# Patient Record
Sex: FEMALE | Race: WHITE | NOT HISPANIC OR LATINO | URBAN - METROPOLITAN AREA
[De-identification: names, ages, dates, MRNs, and addresses within clinical notes are randomized per-mention and may not be internally consistent; named-entity substitution may affect disease eponyms.]

---

## 2019-03-20 ENCOUNTER — INPATIENT (INPATIENT)
Facility: HOSPITAL | Age: 79
LOS: 0 days | Discharge: HOME | End: 2019-03-21
Attending: HOSPITALIST | Admitting: HOSPITALIST

## 2019-03-20 VITALS
TEMPERATURE: 98 F | RESPIRATION RATE: 18 BRPM | HEART RATE: 95 BPM | WEIGHT: 199.96 LBS | OXYGEN SATURATION: 95 % | DIASTOLIC BLOOD PRESSURE: 74 MMHG | SYSTOLIC BLOOD PRESSURE: 155 MMHG

## 2019-03-20 LAB
ALBUMIN SERPL ELPH-MCNC: 4.4 G/DL — SIGNIFICANT CHANGE UP (ref 3.5–5.2)
ALP SERPL-CCNC: 89 U/L — SIGNIFICANT CHANGE UP (ref 30–115)
ALT FLD-CCNC: 10 U/L — SIGNIFICANT CHANGE UP (ref 0–41)
ANION GAP SERPL CALC-SCNC: 10 MMOL/L — SIGNIFICANT CHANGE UP (ref 7–14)
APTT BLD: 30.2 SEC — SIGNIFICANT CHANGE UP (ref 27–39.2)
AST SERPL-CCNC: 12 U/L — SIGNIFICANT CHANGE UP (ref 0–41)
BASOPHILS # BLD AUTO: 0.06 K/UL — SIGNIFICANT CHANGE UP (ref 0–0.2)
BASOPHILS NFR BLD AUTO: 0.9 % — SIGNIFICANT CHANGE UP (ref 0–1)
BILIRUB SERPL-MCNC: 0.5 MG/DL — SIGNIFICANT CHANGE UP (ref 0.2–1.2)
BUN SERPL-MCNC: 7 MG/DL — LOW (ref 10–20)
CALCIUM SERPL-MCNC: 9.1 MG/DL — SIGNIFICANT CHANGE UP (ref 8.5–10.1)
CHLORIDE SERPL-SCNC: 105 MMOL/L — SIGNIFICANT CHANGE UP (ref 98–110)
CO2 SERPL-SCNC: 25 MMOL/L — SIGNIFICANT CHANGE UP (ref 17–32)
CREAT SERPL-MCNC: <0.5 MG/DL — LOW (ref 0.7–1.5)
EOSINOPHIL # BLD AUTO: 0.05 K/UL — SIGNIFICANT CHANGE UP (ref 0–0.7)
EOSINOPHIL NFR BLD AUTO: 0.8 % — SIGNIFICANT CHANGE UP (ref 0–8)
GLUCOSE SERPL-MCNC: 109 MG/DL — HIGH (ref 70–99)
HCT VFR BLD CALC: 47.4 % — HIGH (ref 37–47)
HGB BLD-MCNC: 15.7 G/DL — SIGNIFICANT CHANGE UP (ref 12–16)
IMM GRANULOCYTES NFR BLD AUTO: 0.5 % — HIGH (ref 0.1–0.3)
INR BLD: 0.98 RATIO — SIGNIFICANT CHANGE UP (ref 0.65–1.3)
LYMPHOCYTES # BLD AUTO: 0.95 K/UL — LOW (ref 1.2–3.4)
LYMPHOCYTES # BLD AUTO: 14.8 % — LOW (ref 20.5–51.1)
MCHC RBC-ENTMCNC: 28.2 PG — SIGNIFICANT CHANGE UP (ref 27–31)
MCHC RBC-ENTMCNC: 33.1 G/DL — SIGNIFICANT CHANGE UP (ref 32–37)
MCV RBC AUTO: 85.1 FL — SIGNIFICANT CHANGE UP (ref 81–99)
MONOCYTES # BLD AUTO: 0.27 K/UL — SIGNIFICANT CHANGE UP (ref 0.1–0.6)
MONOCYTES NFR BLD AUTO: 4.2 % — SIGNIFICANT CHANGE UP (ref 1.7–9.3)
NEUTROPHILS # BLD AUTO: 5.08 K/UL — SIGNIFICANT CHANGE UP (ref 1.4–6.5)
NEUTROPHILS NFR BLD AUTO: 78.8 % — HIGH (ref 42.2–75.2)
NRBC # BLD: 0 /100 WBCS — SIGNIFICANT CHANGE UP (ref 0–0)
PLATELET # BLD AUTO: 275 K/UL — SIGNIFICANT CHANGE UP (ref 130–400)
POTASSIUM SERPL-MCNC: 4.1 MMOL/L — SIGNIFICANT CHANGE UP (ref 3.5–5)
POTASSIUM SERPL-SCNC: 4.1 MMOL/L — SIGNIFICANT CHANGE UP (ref 3.5–5)
PROT SERPL-MCNC: 7.4 G/DL — SIGNIFICANT CHANGE UP (ref 6–8)
PROTHROM AB SERPL-ACNC: 11.3 SEC — SIGNIFICANT CHANGE UP (ref 9.95–12.87)
RBC # BLD: 5.57 M/UL — HIGH (ref 4.2–5.4)
RBC # FLD: 13.2 % — SIGNIFICANT CHANGE UP (ref 11.5–14.5)
SODIUM SERPL-SCNC: 140 MMOL/L — SIGNIFICANT CHANGE UP (ref 135–146)
TROPONIN T SERPL-MCNC: <0.01 NG/ML — SIGNIFICANT CHANGE UP
WBC # BLD: 6.44 K/UL — SIGNIFICANT CHANGE UP (ref 4.8–10.8)
WBC # FLD AUTO: 6.44 K/UL — SIGNIFICANT CHANGE UP (ref 4.8–10.8)

## 2019-03-20 RX ORDER — MECLIZINE HCL 12.5 MG
25 TABLET ORAL THREE TIMES A DAY
Qty: 0 | Refills: 0 | Status: DISCONTINUED | OUTPATIENT
Start: 2019-03-20 | End: 2019-03-21

## 2019-03-20 RX ORDER — ALPRAZOLAM 0.25 MG
0.25 TABLET ORAL ONCE
Qty: 0 | Refills: 0 | Status: DISCONTINUED | OUTPATIENT
Start: 2019-03-20 | End: 2019-03-20

## 2019-03-20 RX ORDER — ALPRAZOLAM 0.25 MG
0 TABLET ORAL
Qty: 0 | Refills: 0 | COMMUNITY

## 2019-03-20 RX ORDER — CHLORHEXIDINE GLUCONATE 213 G/1000ML
1 SOLUTION TOPICAL
Qty: 0 | Refills: 0 | Status: DISCONTINUED | OUTPATIENT
Start: 2019-03-20 | End: 2019-03-21

## 2019-03-20 RX ORDER — ALPRAZOLAM 0.25 MG
0.25 TABLET ORAL EVERY 8 HOURS
Qty: 0 | Refills: 0 | Status: DISCONTINUED | OUTPATIENT
Start: 2019-03-20 | End: 2019-03-21

## 2019-03-20 RX ADMIN — Medication 0.25 MILLIGRAM(S): at 15:49

## 2019-03-20 NOTE — ED PROVIDER NOTE - OBJECTIVE STATEMENT
this is 78 yo female presents to ed for evaluation of dizziness  going on for about 3 weeks. patient states she is dizzy in am when she is first getting up. patients that for past couple of days zulma today . patient is denying any other symptoms

## 2019-03-20 NOTE — ED PROVIDER NOTE - ATTENDING CONTRIBUTION TO CARE
79F presenting with 3 weeks of dizziness worse when she wakes up. Has trouble walking. Endorses intermittent vertigo and other occurrences. Worse today. 79F presenting with 3 weeks of dizziness worse when she wakes up. Has trouble walking. Endorses intermittent vertigo sensation. Worse today.

## 2019-03-20 NOTE — ED ADULT NURSE NOTE - CHPI ED NUR SYMPTOMS NEG
no weakness/no confusion/no loss of consciousness/no vomiting/no fever/no nausea/no numbness/no blurred vision/no change in level of consciousness

## 2019-03-20 NOTE — ED ADULT NURSE NOTE - NSIMPLEMENTINTERV_GEN_ALL_ED
Implemented All Universal Safety Interventions:  [place] to call system. Call bell, personal items and telephone within reach. Instruct patient to call for assistance. Room bathroom lighting operational. Non-slip footwear when patient is off stretcher. Physically safe environment: no spills, clutter or unnecessary equipment. Stretcher in lowest position, wheels locked, appropriate side rails in place.

## 2019-03-20 NOTE — H&P ADULT - NSHPPHYSICALEXAM_GEN_ALL_CORE
Vital Signs Last 24 Hrs  T(C): 36 (20 Mar 2019 15:44), Max: 36.5 (20 Mar 2019 12:54)  T(F): 96.8 (20 Mar 2019 15:44), Max: 97.7 (20 Mar 2019 12:54)  HR: 77 (20 Mar 2019 15:44) (77 - 95)  BP: 136/85 (20 Mar 2019 15:44) (136/85 - 155/74)  BP(mean): --  RR: 18 (20 Mar 2019 15:44) (18 - 18)  SpO2: 96% (20 Mar 2019 15:44) (95% - 96%)    PHYSICAL EXAM:  GENERAL: NAD, well-groomed, well-developed  HEAD:  Atraumatic, Normocephalic  EYES: EOMI, PERRLA, conjunctiva and sclera clear  NERVOUS SYSTEM:  Alert & Oriented X 4, Good concentration; Motor Strength 5/5 B/L upper and lower extremities; DTRs 2+ intact and symmetric  CHEST/LUNG: Clear to percussion bilaterally; No rales, rhonchi, wheezing, or rubs  HEART: Regular rate and rhythm; No murmurs, rubs, or gallops  ABDOMEN: Soft, Nontender, Nondistended; Bowel sounds present  EXTREMITIES:  2+ Peripheral Pulses, No clubbing, cyanosis, or edema  SKIN: No rashes or lesions

## 2019-03-20 NOTE — ED PROVIDER NOTE - PHYSICAL EXAMINATION
--EXAM--  VITAL SIGNS: I have reviewed vs documented at present.  CONSTITUTIONAL: Well-developed; well-nourished; in no acute distress.   SKIN: Warm and dry, no acute rash.   HEAD: Normocephalic; atraumatic.  EYES: PERRL, EOM intact; conjunctiva and sclera clear. No nystagmus.  ENT: No nasal discharge; airway clear.  NECK: Supple; non tender.  CARD: S1, S2, Regular rate and rhythm.   RESP: No wheezes, rales or rhonchi.  ABD: Normal bowel sounds; soft; non-distended; non-tender.  EXT: Normal ROM.   NEURO: Alert, oriented, grossly unremarkable. Strength 5/5 in all extremities. Sensation intact throughout.  walked patient in ed and she is off balanced  PSYCH: Cooperative, appropriate.

## 2019-03-20 NOTE — H&P ADULT - ASSESSMENT
78 yo female presents to ed for evaluation of dizziness going on for about 3 weeks. Patient states she is dizzy in the morning upon waking up but for the past two days the dizziness has been present all day.  Denies chest pain, shortness of breath, headache, weakness or vision problems.      #Vertigo  - trial of meclizine  - neuro consult   - pt/rehab    #anxiety/depression  -continue xanax prn      # Progress Note Handoff  PENDING as follows  consults: neuro  Test:   Other:  Family discussion:  Disposition: anticipate d/c in am if cleared by neuro   Home __x__ or SNF___ Other _____ Unknown at this time ____

## 2019-03-20 NOTE — ED PROVIDER NOTE - NS ED ROS FT
Review of Systems:  	•	CONSTITUTIONAL - no fever, no diaphoresis, no chills  	•	SKIN - no rash  	•	HEMATOLOGIC - no bleeding, no bruising  	•	EYES - no eye pain, no blurry vision  	•	ENT - no change in hearing, no sore throat, no ear pain or tinnitus  	•	RESPIRATORY - no shortness of breath, no cough  	•	CARDIAC - no chest pain, no palpitations  	•	GI - no abd pain, no nausea, no vomiting, no diarrhea, no constipation  	•	GENITO-URINARY - no discharge, no dysuria; no hematuria, no increased urinary frequency  	•	MUSCULOSKELETAL - no joint paint, no swelling, no redness  	•	NEUROLOGIC - dizziness   no weakness, no headache, no paresthesias, no LOC  	•	PSYCH - no anxiety, non suicidal, non homicidal, no hallucination, no depression

## 2019-03-20 NOTE — ED PROVIDER NOTE - CLINICAL SUMMARY MEDICAL DECISION MAKING FREE TEXT BOX
Pt with persistent vertigo despite treatment. admitted to medicine for ambulation dysfunction and treatment of dizziness.

## 2019-03-20 NOTE — H&P ADULT - HISTORY OF PRESENT ILLNESS
80 yo female presents to ed for evaluation of dizziness going on for about 3 weeks. Patient states she is dizzy in the morning upon waking up but for the past two days the dizziness has been present all day.  Denies chest pain, shortness of breath, headache, weakness or vision problems.

## 2019-03-20 NOTE — H&P ADULT - NSHPLABSRESULTS_GEN_ALL_CORE
15.7   6.44  )-----------( 275      ( 20 Mar 2019 13:35 )             47.4       03-20    140  |  105  |  7<L>  ----------------------------<  109<H>  4.1   |  25  |  <0.5<L>    Ca    9.1      20 Mar 2019 13:35    TPro  7.4  /  Alb  4.4  /  TBili  0.5  /  DBili  x   /  AST  12  /  ALT  10  /  AlkPhos  89  03-20      < from: CT Head No Cont (03.20.19 @ 14:04) >    IMPRESSION:     1.  Periventricular and subcortical white matter chronic small vessel   ischemic changes.    2.  No acute mass effect, midline shift or hemorrhage.      3.  If the patient continues to be symptomatic follow-up MRI of the brain   may be helpful for further evaluation.    < end of copied text >

## 2019-03-21 VITALS
RESPIRATION RATE: 16 BRPM | HEART RATE: 85 BPM | DIASTOLIC BLOOD PRESSURE: 61 MMHG | SYSTOLIC BLOOD PRESSURE: 111 MMHG | TEMPERATURE: 98 F

## 2019-03-21 RX ORDER — MECLIZINE HCL 12.5 MG
1 TABLET ORAL
Qty: 90 | Refills: 0 | OUTPATIENT
Start: 2019-03-21 | End: 2019-04-19

## 2019-03-21 RX ADMIN — Medication 0.25 MILLIGRAM(S): at 09:47

## 2019-03-21 RX ADMIN — CHLORHEXIDINE GLUCONATE 1 APPLICATION(S): 213 SOLUTION TOPICAL at 09:11

## 2019-03-21 RX ADMIN — Medication 0.25 MILLIGRAM(S): at 01:29

## 2019-03-21 RX ADMIN — Medication 25 MILLIGRAM(S): at 06:21

## 2019-03-21 NOTE — CHART NOTE - NSCHARTNOTEFT_GEN_A_CORE
Pt seen and examined at bedside. Dizziness resolved. Discussed vestibular rehab and use of meclizine with pt. Agreeable for discharge    PHYSICAL EXAM:  GENERAL: NAD, speaks in full sentences, no signs of respiratory distress  HEAD:  Atraumatic, Normocephalic  EYES:  Anicteric  NECK: Supple, No JVD  CHEST/LUNG: Clear to auscultation bilaterally; No wheeze; No crackles; No accessory muscles used  HEART: Regular rate and rhythm; No murmurs;   ABDOMEN: Soft, Nontender, Nondistended; Bowel sounds present; No guarding  EXTREMITIES:  2+ Peripheral Pulses, No cyanosis or edema  PSYCH: AAOx3  NEUROLOGY: non-focal  SKIN: No rashes or lesions

## 2019-03-21 NOTE — DISCHARGE NOTE PROVIDER - HOSPITAL COURSE
80 yo F presents for evaluation of several weeks of dizziness. CT head was negative. Likely BPPV. Improved after meclizine. Seen by neurology, medically stable for d/c with vestibular rehab.

## 2019-03-21 NOTE — DISCHARGE NOTE PROVIDER - NSDCCPCAREPLAN_GEN_ALL_CORE_FT
PRINCIPAL DISCHARGE DIAGNOSIS  Diagnosis: Dizziness  Assessment and Plan of Treatment: due to veritgo.

## 2019-03-21 NOTE — CONSULT NOTE ADULT - SUBJECTIVE AND OBJECTIVE BOX
Neurology Consultation note    Name  SANDHYA CEE    HPI:  80 yo female presents to ed for evaluation of dizziness going on for about 3 weeks. Patient states she is dizzy in the morning upon waking up but for the past two days the dizziness has been present all day.  Denies chest pain, shortness of breath, headache, weakness or vision problems. (20 Mar 2019 16:20)    NEURO:  PATIENT IS A 78 YO FEMALE WITH ACUTE ON CHRONIC VERTIGO AND IMBALANCE.  THIS HAS BEEN HAPPENING FOR MONTHS BUT WAS WORSE BEFORE ADM  NOW SHE IS ASYMPTOMATIC  PATIENT TAKES XANAX FOR YRS BUT DIDNT CHANGE OR INCREASE DOSAGE   NO FALLS  NO HEARING LOSS , TINNITUS OR ANTECEDENT ILLNESS    Vital Signs Last 24 Hrs  T(C): 36.7 (21 Mar 2019 05:45), Max: 36.7 (21 Mar 2019 05:45)  T(F): 98 (21 Mar 2019 05:45), Max: 98 (21 Mar 2019 05:45)  HR: 85 (21 Mar 2019 05:45) (77 - 95)  BP: 111/61 (21 Mar 2019 05:45) (111/61 - 164/78)  BP(mean): --  RR: 16 (21 Mar 2019 05:45) (16 - 18)  SpO2: 96% (20 Mar 2019 15:44) (95% - 96%)    Neurological Exam:   Mental status: Awake, alert and oriented x3.  Recent and remote memory intact.  Naming, repetition and comprehension intact.  Attention/concentration intact.  No dysarthria, no aphasia.  Fund of knowledge appropriate.    Cranial nerves: Pupils equally round and reactive to light, visual fields full, no nystagmus, extraocular muscles intact, V1 through V3 intact bilaterally and symmetric, face symmetric, hearing intact to finger rub, palate elevation symmetric, tongue was midline.  Motor:  MRC grading 5/5 b/l UE/LE.   strength 5/5.  Normal tone and bulk.  No abnormal movements.    Sensation: Intact to light touch, proprioception, and pinprick.   Coordination: No dysmetria on finger-to-nose and heel-to-shin.  No dysdiadokinesia.  Reflexes: 2+ in bilateral UE/LE, downgoing toes bilaterally. (-) Mathews.  Gait: CAUTIOUS BUT NOT ATAXIC    Medications  ALPRAZolam 0.25 milliGRAM(s) Oral every 8 hours PRN  chlorhexidine 2% Cloths 1 Application(s) Topical <User Schedule>  chlorhexidine 4% Liquid 1 Application(s) Topical <User Schedule>  meclizine 25 milliGRAM(s) Oral three times a day PRN      Lab  03-20    140  |  105  |  7<L>  ----------------------------<  109<H>  4.1   |  25  |  <0.5<L>    Ca    9.1      20 Mar 2019 13:35    TPro  7.4  /  Alb  4.4  /  TBili  0.5  /  DBili  x   /  AST  12  /  ALT  10  /  AlkPhos  89  03-20                          15.7   6.44  )-----------( 275      ( 20 Mar 2019 13:35 )             47.4     LIVER FUNCTIONS - ( 20 Mar 2019 13:35 )  Alb: 4.4 g/dL / Pro: 7.4 g/dL / ALK PHOS: 89 U/L / ALT: 10 U/L / AST: 12 U/L / GGT: x                   Radiology  CT Head No Cont:   EXAM:  CT BRAIN            PROCEDURE DATE:  03/20/2019            INTERPRETATION:  Clinical History / Reason for exam: Dizziness. Shortness   of breath.    Technique: Multiple contiguous axial CT images of the head were obtained   from the base of the skull to the vertex without administration of   intravenous contrast.    Comparison: None available at this time.       Findings: The ventricles, basal cisterns and sulcal pattern are slightly   prominent consistent with parenchymal volume loss. There are scattered   punctate foci of hypodensities in the periventricular and subcortical   white matter which are nonspecific and without mass effect most likely   consistent with chronic small vessel ischemic changes in a patient of   this stated age. There is no acute mass effect, midline shift or   hemorrhage. No extra-axial fluid collections are identified.    The bones of the calvarium are intact. The paranasal sinuses, bilateral   mastoid complexes and globes and orbits are grossly unremarkable. The   lenses are thin and may have been replaced.    Vascular calcifications are noted in the bilateral carotid arteries   consistent with atherosclerotic changes.    Beam hardening artifact is noted overlying the brain stem and posterior   fossa which is inherent to CT in this location.      IMPRESSION:     1.  Periventricular and subcortical white matter chronic small vessel   ischemic changes.    2.  No acute mass effect, midline shift or hemorrhage.      3.  If the patient continues to be symptomatic follow-up MRI of the brain   may be helpful for further evaluation.              Assessment: 78 YO FEMALE WITH ACUTE ON CHRONIC VERTIGO. VERTIGO OF PERIPHERAL ETIOLOGY, LIKELY BPPV  EXAM NON FOCAL  PATIENT IS ASYMPTOMATIC THIS AM. CTH NO ACUTE CHANGE  GAIT IS CAUTIOUS      Plan:  OUTPT VESTIBULAR THERAPY  MECLIZINE PRN  NO FURTHER INPT W/U FROM NEURO STANDPOINT  PLEASE CALL WITH ANY QUESTIONS

## 2019-03-21 NOTE — DISCHARGE NOTE NURSING/CASE MANAGEMENT/SOCIAL WORK - NSDCDPATPORTLINK_GEN_ALL_CORE
You can access the [facility] Patient Portal, offered by [facility], by registering with the following website: [email or web address]

## 2019-03-21 NOTE — PHYSICAL THERAPY INITIAL EVALUATION ADULT - GENERAL OBSERVATIONS, REHAB EVAL
09:25-09:48 Chart reviewed. Pt encountered sitting at edge of bed,  may be seen by Physical Therapist as confirmed with Nurse. Patient denied unusual pain at rest but "has knee pain form arthritis" when she walks

## 2019-03-25 DIAGNOSIS — F41.9 ANXIETY DISORDER, UNSPECIFIED: ICD-10-CM

## 2019-03-25 DIAGNOSIS — F32.9 MAJOR DEPRESSIVE DISORDER, SINGLE EPISODE, UNSPECIFIED: ICD-10-CM

## 2019-03-25 DIAGNOSIS — Z28.21 IMMUNIZATION NOT CARRIED OUT BECAUSE OF PATIENT REFUSAL: ICD-10-CM

## 2019-03-25 DIAGNOSIS — R42 DIZZINESS AND GIDDINESS: ICD-10-CM

## 2019-03-25 DIAGNOSIS — H81.10 BENIGN PAROXYSMAL VERTIGO, UNSPECIFIED EAR: ICD-10-CM

## 2022-07-12 NOTE — ED PROVIDER NOTE - EKG #1 DATE/TIME
Patient is calling regarding cancelling an appointment      Date/Time: 07/13/2022 @ 10:00 AM    Patient was rescheduled: YES [] NO [x]    Patient requesting call back to reschedule: YES [] NO [x]
20-Mar-2019 16:24

## 2023-07-06 NOTE — PHYSICAL THERAPY INITIAL EVALUATION ADULT - GAIT DEVIATIONS NOTED, PT EVAL
decreased step length/decreased weight-shifting ability/wide RAUL, dec heel strike/pushoff/decreased beth No

## 2024-02-05 ENCOUNTER — EMERGENCY (EMERGENCY)
Facility: HOSPITAL | Age: 84
LOS: 0 days | Discharge: HOME | End: 2024-02-05
Attending: STUDENT IN AN ORGANIZED HEALTH CARE EDUCATION/TRAINING PROGRAM
Payer: MEDICARE

## 2024-02-05 VITALS
HEART RATE: 120 BPM | OXYGEN SATURATION: 94 % | SYSTOLIC BLOOD PRESSURE: 135 MMHG | WEIGHT: 199.96 LBS | DIASTOLIC BLOOD PRESSURE: 72 MMHG | RESPIRATION RATE: 18 BRPM | HEIGHT: 60 IN | TEMPERATURE: 97 F

## 2024-02-05 VITALS
DIASTOLIC BLOOD PRESSURE: 71 MMHG | RESPIRATION RATE: 18 BRPM | SYSTOLIC BLOOD PRESSURE: 129 MMHG | HEART RATE: 93 BPM | OXYGEN SATURATION: 95 %

## 2024-02-05 DIAGNOSIS — R10.13 EPIGASTRIC PAIN: ICD-10-CM

## 2024-02-05 DIAGNOSIS — R00.0 TACHYCARDIA, UNSPECIFIED: ICD-10-CM

## 2024-02-05 DIAGNOSIS — F32.A DEPRESSION, UNSPECIFIED: ICD-10-CM

## 2024-02-05 DIAGNOSIS — F41.9 ANXIETY DISORDER, UNSPECIFIED: ICD-10-CM

## 2024-02-05 DIAGNOSIS — R07.89 OTHER CHEST PAIN: ICD-10-CM

## 2024-02-05 PROBLEM — F32.9 MAJOR DEPRESSIVE DISORDER, SINGLE EPISODE, UNSPECIFIED: Chronic | Status: ACTIVE | Noted: 2019-03-20

## 2024-02-05 LAB
ALBUMIN SERPL ELPH-MCNC: 4.4 G/DL — SIGNIFICANT CHANGE UP (ref 3.5–5.2)
ALP SERPL-CCNC: 98 U/L — SIGNIFICANT CHANGE UP (ref 30–115)
ALT FLD-CCNC: 10 U/L — SIGNIFICANT CHANGE UP (ref 0–41)
ANION GAP SERPL CALC-SCNC: 13 MMOL/L — SIGNIFICANT CHANGE UP (ref 7–14)
AST SERPL-CCNC: 9 U/L — SIGNIFICANT CHANGE UP (ref 0–41)
BASOPHILS # BLD AUTO: 0.06 K/UL — SIGNIFICANT CHANGE UP (ref 0–0.2)
BASOPHILS NFR BLD AUTO: 0.4 % — SIGNIFICANT CHANGE UP (ref 0–1)
BILIRUB SERPL-MCNC: 0.6 MG/DL — SIGNIFICANT CHANGE UP (ref 0.2–1.2)
BUN SERPL-MCNC: 9 MG/DL — LOW (ref 10–20)
CALCIUM SERPL-MCNC: 10.3 MG/DL — SIGNIFICANT CHANGE UP (ref 8.4–10.5)
CHLORIDE SERPL-SCNC: 97 MMOL/L — LOW (ref 98–110)
CO2 SERPL-SCNC: 23 MMOL/L — SIGNIFICANT CHANGE UP (ref 17–32)
CREAT SERPL-MCNC: 0.5 MG/DL — LOW (ref 0.7–1.5)
EGFR: 94 ML/MIN/1.73M2 — SIGNIFICANT CHANGE UP
EOSINOPHIL # BLD AUTO: 0.06 K/UL — SIGNIFICANT CHANGE UP (ref 0–0.7)
EOSINOPHIL NFR BLD AUTO: 0.4 % — SIGNIFICANT CHANGE UP (ref 0–8)
GLUCOSE SERPL-MCNC: 136 MG/DL — HIGH (ref 70–99)
HCT VFR BLD CALC: 46.8 % — SIGNIFICANT CHANGE UP (ref 37–47)
HGB BLD-MCNC: 16.1 G/DL — HIGH (ref 12–16)
IMM GRANULOCYTES NFR BLD AUTO: 0.4 % — HIGH (ref 0.1–0.3)
LIDOCAIN IGE QN: 13 U/L — SIGNIFICANT CHANGE UP (ref 7–60)
LYMPHOCYTES # BLD AUTO: 1.05 K/UL — LOW (ref 1.2–3.4)
LYMPHOCYTES # BLD AUTO: 7.5 % — LOW (ref 20.5–51.1)
MCHC RBC-ENTMCNC: 29.6 PG — SIGNIFICANT CHANGE UP (ref 27–31)
MCHC RBC-ENTMCNC: 34.4 G/DL — SIGNIFICANT CHANGE UP (ref 32–37)
MCV RBC AUTO: 86 FL — SIGNIFICANT CHANGE UP (ref 81–99)
MONOCYTES # BLD AUTO: 0.59 K/UL — SIGNIFICANT CHANGE UP (ref 0.1–0.6)
MONOCYTES NFR BLD AUTO: 4.2 % — SIGNIFICANT CHANGE UP (ref 1.7–9.3)
NEUTROPHILS # BLD AUTO: 12.16 K/UL — HIGH (ref 1.4–6.5)
NEUTROPHILS NFR BLD AUTO: 87.1 % — HIGH (ref 42.2–75.2)
NRBC # BLD: 0 /100 WBCS — SIGNIFICANT CHANGE UP (ref 0–0)
PLATELET # BLD AUTO: 297 K/UL — SIGNIFICANT CHANGE UP (ref 130–400)
PMV BLD: 9.2 FL — SIGNIFICANT CHANGE UP (ref 7.4–10.4)
POTASSIUM SERPL-MCNC: 3.7 MMOL/L — SIGNIFICANT CHANGE UP (ref 3.5–5)
POTASSIUM SERPL-SCNC: 3.7 MMOL/L — SIGNIFICANT CHANGE UP (ref 3.5–5)
PROT SERPL-MCNC: 7.5 G/DL — SIGNIFICANT CHANGE UP (ref 6–8)
RBC # BLD: 5.44 M/UL — HIGH (ref 4.2–5.4)
RBC # FLD: 12.9 % — SIGNIFICANT CHANGE UP (ref 11.5–14.5)
SODIUM SERPL-SCNC: 133 MMOL/L — LOW (ref 135–146)
TROPONIN T, HIGH SENSITIVITY RESULT: 8 NG/L — SIGNIFICANT CHANGE UP (ref 6–13)
TROPONIN T, HIGH SENSITIVITY RESULT: 9 NG/L — SIGNIFICANT CHANGE UP (ref 6–13)
WBC # BLD: 13.98 K/UL — HIGH (ref 4.8–10.8)
WBC # FLD AUTO: 13.98 K/UL — HIGH (ref 4.8–10.8)

## 2024-02-05 PROCEDURE — 84484 ASSAY OF TROPONIN QUANT: CPT

## 2024-02-05 PROCEDURE — 99285 EMERGENCY DEPT VISIT HI MDM: CPT | Mod: 25

## 2024-02-05 PROCEDURE — 96374 THER/PROPH/DIAG INJ IV PUSH: CPT

## 2024-02-05 PROCEDURE — 96375 TX/PRO/DX INJ NEW DRUG ADDON: CPT

## 2024-02-05 PROCEDURE — 36415 COLL VENOUS BLD VENIPUNCTURE: CPT

## 2024-02-05 PROCEDURE — 71045 X-RAY EXAM CHEST 1 VIEW: CPT | Mod: 26

## 2024-02-05 PROCEDURE — 99285 EMERGENCY DEPT VISIT HI MDM: CPT

## 2024-02-05 PROCEDURE — 85025 COMPLETE CBC W/AUTO DIFF WBC: CPT

## 2024-02-05 PROCEDURE — 83690 ASSAY OF LIPASE: CPT

## 2024-02-05 PROCEDURE — 93005 ELECTROCARDIOGRAM TRACING: CPT

## 2024-02-05 PROCEDURE — 71045 X-RAY EXAM CHEST 1 VIEW: CPT

## 2024-02-05 PROCEDURE — 93010 ELECTROCARDIOGRAM REPORT: CPT

## 2024-02-05 PROCEDURE — 80053 COMPREHEN METABOLIC PANEL: CPT

## 2024-02-05 RX ORDER — FAMOTIDINE 10 MG/ML
20 INJECTION INTRAVENOUS ONCE
Refills: 0 | Status: COMPLETED | OUTPATIENT
Start: 2024-02-05 | End: 2024-02-05

## 2024-02-05 RX ORDER — ONDANSETRON 8 MG/1
4 TABLET, FILM COATED ORAL ONCE
Refills: 0 | Status: COMPLETED | OUTPATIENT
Start: 2024-02-05 | End: 2024-02-05

## 2024-02-05 RX ORDER — SODIUM CHLORIDE 9 MG/ML
1000 INJECTION, SOLUTION INTRAVENOUS ONCE
Refills: 0 | Status: COMPLETED | OUTPATIENT
Start: 2024-02-05 | End: 2024-02-05

## 2024-02-05 RX ADMIN — FAMOTIDINE 20 MILLIGRAM(S): 10 INJECTION INTRAVENOUS at 08:38

## 2024-02-05 RX ADMIN — ONDANSETRON 4 MILLIGRAM(S): 8 TABLET, FILM COATED ORAL at 08:38

## 2024-02-05 RX ADMIN — SODIUM CHLORIDE 1000 MILLILITER(S): 9 INJECTION, SOLUTION INTRAVENOUS at 08:38

## 2024-02-05 NOTE — ED PROVIDER NOTE - CLINICAL SUMMARY MEDICAL DECISION MAKING FREE TEXT BOX
83-year-old female, past medical history of anxiety and depression, cataract surgery, presenting with epigastric pain this morning that feels like her typical "agita," no alleviating factors, worse after eating this morning, no associated symptoms.  She states that she feels better now.  Denies fevers, chills, chest pain, shortness of breath, nausea, vomiting, abdominal pain.  Elderly female in no acute distress.  Vitals reviewed.  Physical exam benign.  Right pupil 3 mm left pupil 2 mm both reactive to light.  Abdomen soft nondistended nontender.  Labs and EKG ordered and reviewed.  Imaging ordered and reviewed.  Medications given and effects reassessed.  Discussed all results with patient and son at bedside. Never had cardiac work up. 83-year-old female, past medical history of anxiety and depression, cataract surgery, presenting with epigastric pain this morning that feels like her typical "agita," no alleviating factors, worse after eating this morning, no associated symptoms.  She states that she feels better now.  Denies fevers, chills, chest pain, shortness of breath, nausea, vomiting, abdominal pain.  Elderly female in no acute distress.  Vitals reviewed.  Physical exam benign.  Right pupil 3 mm left pupil 2 mm both reactive to light.  Abdomen soft nondistended nontender.  Labs and EKG ordered and reviewed.  Imaging ordered and reviewed.  Medications given and effects reassessed with improvement.  Discussed all results with patient and son at bedside. Never had cardiac work up. Trop 8 & 9. Given return precautions and follow up outpatient. Patient and son comfortable with plan. Escalation to admission considered. Patient feels improved and prefers outpatient follow up.

## 2024-02-05 NOTE — ED PROVIDER NOTE - OBJECTIVE STATEMENT
83-year-old female with a history of anxiety and depression presents with epigastric pain this morning that was slightly worse than her typical "I did."  Denies shortness of breath difficulty breathing nausea vomiting.  Symptoms worse after she ate breakfast this morning but improved in the ER.  Denies shortness of breath chest pain difficulty breathing nausea vomiting.  Accompanied by .  Has not followed up with cardiologist

## 2024-02-05 NOTE — ED PROVIDER NOTE - PATIENT PORTAL LINK FT
You can access the FollowMyHealth Patient Portal offered by [facility] by registering at the following website: [email or web address]. By joining [facility]’s FollowMyHealth portal, you will also be able to view your health information using other applications (apps) compatible with our system.

## 2024-02-05 NOTE — ED PROVIDER NOTE - NSFOLLOWUPCLINICS_GEN_ALL_ED_FT
Cardiology at [facility] ([facility])  Cardiology  [address], [address]  [place] [address]  Phone: [phone]  Fax:   Follow Up Time: 1-3 Days    [facility] Cardiology at [place]  Cardiology  [address], [address]  [place] [address]  Phone: [phone]  Fax: [phone]  Follow Up Time: 1-3 Days

## 2024-02-05 NOTE — ED PROVIDER NOTE - NSFOLLOWUPINSTRUCTIONS_ED_ALL_ED_FT
Our Emergency Department Referral Coordinators will be reaching out to you in the next 24-48 hours from 9:00am to 5:00pm with a follow up appointment. Please expect a phone call from the hospital in that time frame. If you do not receive a call or if you have any questions or concerns, you can reach them at   [phone]    ~~~  Nonspecific Chest Pain  Chest pain can be caused by many different conditions. Some causes of chest pain can be life-threatening. These will require treatment right away. Serious causes of chest pain include:  Heart attack.A tear in the body's main blood vessel.Redness and swelling (inflammation) around your heart.Blood clot in your lungs.Other causes of chest pain may not be so serious. These include:  Heartburn.Anxiety or stress.Damage to bones or muscles in your chest.Lung infections.Chest pain can feel like:  Pain or discomfort in your chest.Crushing, pressure, aching, or squeezing pain. Burning or tingling. Dull or sharp pain that is worse when you move, cough, or take a deep breath. Pain or discomfort that is also felt in your back, neck, jaw, shoulder, or arm, or pain that spreads to any of these areas.It is hard to know whether your pain is caused by something that is serious or something that is not so serious. So it is important to see your doctor right away if you have chest pain.  Follow these instructions at home:  Medicines     Take over-the-counter and prescription medicines only as told by your doctor.If you were prescribed an antibiotic medicine, take it as told by your doctor. Do not stop taking the antibiotic even if you start to feel better.Lifestyle        Rest as told by your doctor.Do not use any products that contain nicotine or tobacco, such as cigarettes, e-cigarettes, and chewing tobacco. If you need help quitting, ask your doctor.Do not drink alcohol.Make lifestyle changes as told by your doctor. These may include:  Getting regular exercise. Ask your doctor what activities are safe for you.Eating a heart-healthy diet. A diet and nutrition specialist (dietitian) can help you to learn healthy eating options.Staying at a healthy weight.Treating diabetes or high blood pressure, if needed.Lowering your stress. Activities such as yoga and relaxation techniques can help.General instructions     Pay attention to any changes in your symptoms. Tell your doctor about them or any new symptoms.Avoid any activities that cause chest pain.Keep all follow-up visits as told by your doctor. This is important. You may need more testing if your chest pain does not go away.Contact a doctor if:  Your chest pain does not go away.You feel depressed.You have a fever.Get help right away if:  Your chest pain is worse.You have a cough that gets worse, or you cough up blood.You have very bad (severe) pain in your belly (abdomen).You pass out (faint).You have either of these for no clear reason:  Sudden chest discomfort.Sudden discomfort in your arms, back, neck, or jaw.You have shortness of breath at any time.You suddenly start to sweat, or your skin gets clammy.You feel sick to your stomach (nauseous).You throw up (vomit).You suddenly feel lightheaded or dizzy.You feel very weak or tired.Your heart starts to beat fast, or it feels like it is skipping beats.These symptoms may be an emergency. Do not wait to see if the symptoms will go away. Get medical help right away. Call your local emergency services (911 in the [place] Do not drive yourself to the hospital.   Summary  Chest pain can be caused by many different conditions. The cause may be serious and need treatment right away. If you have chest pain, see your doctor right away.Follow your doctor's instructions for taking medicines and making lifestyle changes. Keep all follow-up visits as told by your doctor. This includes visits for any further testing if your chest pain does not go away.Be sure to know the signs that show that your condition has become worse. Get help right away if you have these symptoms.This information is not intended to replace advice given to you by your health care provider. Make sure you discuss any questions you have with your health care provider.    Document Released: 06/05/2009 Document Revised: 06/20/2019 Document Reviewed: 06/20/2019  [employer] Interactive Patient Education © 2019 [employer]

## 2024-02-05 NOTE — ED ADULT TRIAGE NOTE - CHIEF COMPLAINT QUOTE
As per EMS, patient complaining of dyspepsia and chest discomfort. Patient states she has been "burping". On scene patient was hypertensive and EMS gave one nitro spray.

## 2024-02-05 NOTE — ED ADULT NURSE NOTE - NSFALLHARMRISKINTERV_ED_ALL_ED
